# Patient Record
Sex: MALE | Race: WHITE | Employment: FULL TIME | URBAN - METROPOLITAN AREA
[De-identification: names, ages, dates, MRNs, and addresses within clinical notes are randomized per-mention and may not be internally consistent; named-entity substitution may affect disease eponyms.]

---

## 2020-03-23 ENCOUNTER — APPOINTMENT (OUTPATIENT)
Dept: GENERAL RADIOLOGY | Age: 40
End: 2020-03-23
Attending: PHYSICIAN ASSISTANT
Payer: COMMERCIAL

## 2020-03-23 ENCOUNTER — HOSPITAL ENCOUNTER (EMERGENCY)
Age: 40
Discharge: HOME | End: 2020-03-23
Attending: EMERGENCY MEDICINE | Admitting: EMERGENCY MEDICINE
Payer: COMMERCIAL

## 2020-03-23 VITALS
TEMPERATURE: 97.3 F | SYSTOLIC BLOOD PRESSURE: 138 MMHG | DIASTOLIC BLOOD PRESSURE: 86 MMHG | RESPIRATION RATE: 14 BRPM | OXYGEN SATURATION: 97 % | HEART RATE: 65 BPM

## 2020-03-23 DIAGNOSIS — J06.9 VIRAL URI WITH COUGH: Primary | ICD-10-CM

## 2020-03-23 PROCEDURE — 99283 EMERGENCY DEPT VISIT LOW MDM: CPT

## 2020-03-23 NOTE — DISCHARGE INSTRUCTIONS
Preventing the Spread of Coronavirus Disease 2019 in Homes and Residential Communities   For the most recent information go to [email or web address]    Prevention steps for People with confirmed or suspected COVID-19 (including persons under investigation) who do not need to be hospitalized  and   People with confirmed COVID-19 who were hospitalized and determined to be medically stable to go home    Your healthcare provider and public health staff will evaluate whether you can be cared for at home. If it is determined that you do not need to be hospitalized and can be isolated at home, you will be monitored by staff from your local or state health department. You should follow the prevention steps below until a healthcare provider or local or state health department says you can return to your normal activities. Stay home except to get medical care  People who are mildly ill with COVID-19 are able to isolate at home during their illness. You should restrict activities outside your home, except for getting medical care. Do not go to work, school, or public areas. Avoid using public transportation, ride-sharing, or taxis. Separate yourself from other people and animals in your home  People: As much as possible, you should stay in a specific room and away from other people in your home. Also, you should use a separate bathroom, if available. Animals: You should restrict contact with pets and other animals while you are sick with COVID-19, just like you would around other people. Although there have not been reports of pets or other animals becoming sick with COVID-19, it is still recommended that people sick with COVID-19 limit contact with animals until more information is known about the virus. When possible, have another member of your household care for your animals while you are sick.  If you are sick with COVID-19, avoid contact with your pet, including petting, snuggling, being kissed or licked, and sharing food. If you must care for your pet or be around animals while you are sick, wash your hands before and after you interact with pets and wear a facemask. Call ahead before visiting your doctor  If you have a medical appointment, call the healthcare provider and tell them that you have or may have COVID-19. This will help the healthcare providers office take steps to keep other people from getting infected or exposed. Wear a facemask  You should wear a facemask when you are around other people (e.g., sharing a room or vehicle) or pets and before you enter a healthcare providers office. If you are not able to wear a facemask (for example, because it causes trouble breathing), then people who live with you should not stay in the same room with you, or they should wear a facemask if they enter your room. Cover your coughs and sneezes  Cover your mouth and nose with a tissue when you cough or sneeze. Throw used tissues in a lined trash can. Immediately wash your hands with soap and water for at least 20 seconds or, if soap and water are not available, clean your hands with an alcohol-based hand  that contains at least 60% alcohol. Clean your hands often  Wash your hands often with soap and water for at least 20 seconds, especially after blowing your nose, coughing, or sneezing; going to the bathroom; and before eating or preparing food. If soap and water are not readily available, use an alcohol-based hand  with at least 60% alcohol, covering all surfaces of your hands and rubbing them together until they feel dry. Soap and water are the best option if hands are visibly dirty. Avoid touching your eyes, nose, and mouth with unwashed hands. Avoid sharing personal household items  You should not share dishes, drinking glasses, cups, eating utensils, towels, or bedding with other people or pets in your home.  After using these items, they should be washed thoroughly with soap and water. Clean all high-touch surfaces everyday  High touch surfaces include counters, tabletops, doorknobs, bathroom fixtures, toilets, phones, keyboards, tablets, and bedside tables. Also, clean any surfaces that may have blood, stool, or body fluids on them. Use a household cleaning spray or wipe, according to the label instructions. Labels contain instructions for safe and effective use of the cleaning product including precautions you should take when applying the product, such as wearing gloves and making sure you have good ventilation during use of the product. Monitor your symptoms  Seek prompt medical attention if your illness is worsening (e.g., difficulty breathing). Before seeking care, call your healthcare provider and tell them that you have, or are being evaluated for, COVID-19. Put on a facemask before you enter the facility. These steps will help the healthcare providers office to keep other people in the office or waiting room from getting infected or exposed. Ask your healthcare provider to call the local or [facility] health department. Persons who are placed under active monitoring or facilitated self-monitoring should follow instructions provided by their local health department or occupational health professionals, as appropriate. When working with your local health department check their available hours. If you have a medical emergency and need to call 911, notify the dispatch personnel that you have, or are being evaluated for COVID-19. If possible, put on a facemask before emergency medical services arrive. Discontinuing home isolation  Patients with confirmed COVID-19 should remain under home isolation precautions until the risk of secondary transmission to others is thought to be low.  The decision to discontinue home isolation precautions should be made on a case-by-case basis, in consultation with healthcare providers and state and [facility] health departments.

## 2020-03-23 NOTE — LETTER
NOTIFICATION OF RETURN TO WORK 
 
3/23/2020 11:22 AM 
 
Mr. Berta Gustafson [address] 
[address] Reasoner To Whom It May Concern: 
 
Berta Gustafson was under the care of [facility]. He will be able to return to work on Monday, 4/6/20. If there are questions or concerns please have the patient contact our office. Sincerely, Rupinder Christianson PA-C

## 2020-03-23 NOTE — ED TRIAGE NOTES
Pt arrived from home with c/o cough, body aches x 4 days. Vital signs are WNL, pt is afebrile. Pt works at [employer] and has a coworker who's child tested positive for COVID-19.

## 2020-03-23 NOTE — ED NOTES
Pt c/o cough and SOB. 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 I performed a brief evaluation, including history and physical, of the patient here in triage and I have determined that pt will need further treatment and evaluation from the main side ER physician. I have placed initial orders to help in expediting patients care.      March 23, 2020 at 10:58 AM - April KATEY Umanzor PA-C

## 2020-03-23 NOTE — ED PROVIDER NOTES
EMERGENCY DEPARTMENT HISTORY AND PHYSICAL EXAM    11:28 AM      Date: 3/23/2020  Patient Name: Mel Fairbanks    History of Presenting Illness     Chief Complaint   Patient presents with    Cough       History Provided By: Patient    Chief Complaint: cough, body aches, possible exposure to COVID-19, rhinorrhea, congestion  Duration: 4 Days  Timing:  Acute  Location:   Quality: Aching  Severity: Moderate  Modifying Factors: none  Associated Symptoms: denies any other associated signs or symptoms      Additional History (Context):Yeison Denney is a 35 y.o. male who presents to the emergency department for evaluation of cough, body aches, and possible exposure to COVID-19. Patient reports symptoms x4 days. He states 1 of his coworkers has a child who tested positive for Covid-19. No fever, nausea, vomiting, diarrhea, chest pain, or any other complaints. Patient has no other medical problems. He states he previously smoked cigarettes, but no longer smokes cigarettes. No history of asthma. Patient has been taking over-the-counter Mucinex. PCP:  None      Current Outpatient Medications   Medication Sig Dispense Refill    naproxen sodium (ANAPROX DS) 550 mg tablet Take 1 Tab by mouth three (3) times daily (with meals). 20 Tab 0       Past History     Past Medical History:  History reviewed. No pertinent past medical history. Past Surgical History:  History reviewed. No pertinent surgical history. Family History:  History reviewed. No pertinent family history. Social History:  Social History     Tobacco Use    Smoking status: Current Every Day Smoker     Packs/day: 1.00   Substance Use Topics    Alcohol use: Yes     Alcohol/week: 2.5 standard drinks     Types: 3 Glasses of wine per week    Drug use: Not on file       Allergies:  No Known Allergies      Review of Systems     Review of Systems   Constitutional: Positive for fatigue. Negative for chills and fever.    HENT: Positive for congestion and rhinorrhea. Negative for sore throat. Respiratory: Positive for cough. Negative for shortness of breath. Cardiovascular: Negative for chest pain. Gastrointestinal: Negative for abdominal pain, blood in stool, constipation, diarrhea, nausea and vomiting. Genitourinary: Negative for dysuria, frequency and hematuria. Musculoskeletal: Positive for myalgias. Negative for back pain. Skin: Negative for rash and wound. Neurological: Negative for dizziness and headaches. All other systems reviewed and are negative. Physical Exam     Visit Vitals  /86 (BP 1 Location: Left arm)   Pulse 65   Temp 97.3 °F (36.3 °C)   Resp 14   SpO2 97%       Physical Exam  Vitals signs and nursing note reviewed. Constitutional:       General: He is not in acute distress. Appearance: He is well-developed. He is not diaphoretic. HENT:      Head: Normocephalic and atraumatic. Eyes:      Conjunctiva/sclera: Conjunctivae normal.   Neck:      Musculoskeletal: Normal range of motion and neck supple. Cardiovascular:      Rate and Rhythm: Normal rate and regular rhythm. Heart sounds: Normal heart sounds. Pulmonary:      Effort: Pulmonary effort is normal. No respiratory distress. Breath sounds: Normal breath sounds. Chest:      Chest wall: No tenderness. Abdominal:      General: Bowel sounds are normal. There is no distension. Palpations: Abdomen is soft. Tenderness: There is no abdominal tenderness. There is no guarding or rebound. Musculoskeletal: Normal range of motion. General: No deformity. Skin:     General: Skin is warm and dry. Neurological:      Mental Status: He is alert and oriented to person, place, and time. Diagnostic Study Results     Labs -  No results found for this or any previous visit (from the past 12 hour(s)). Radiologic Studies -   No results found. Medical Decision Making   I am the first provider for this patient.     I reviewed the vital signs, available nursing notes, past medical history, past surgical history, family history and social history. Vital Signs-Reviewed the patient's vital signs. Pulse Oximetry Analysis -  97% on room air (Interpretation)    Records Reviewed: Nursing Notes and Old Medical Records (Time of Review: 11:28 AM)    ED Course: Progress Notes, Reevaluation, and Consults:    Provider Notes (Medical Decision Making):   Differential Diagnosis:  influenza, COVID-19, acute bronchitis, URI, streptococcal pharyngitis, pneumonia, asthma exacerbation, allergic rhinitis      Plan: Patient presents ambulatory in no significant distress with normal vitals. Exam and HPI consistent with acute, uncomplicated viral etiology. Possibly Covid-19 given potential exposure and . This patient is well otherwise, no indication for testing at this time. Patient is advised to self isolate for 14 days. Symptomatic treatments at home as well as plenty rest and fluids. At this time, patient is stable and appropriate for discharge home. Patient demonstrates understanding of current diagnoses and is in agreement with the treatment plan. They are advised that while the likelihood of serious underlying condition is low at this point given the evaluation performed today, we cannot fully rule it out. They are advised to immediately return with any new symptoms or worsening of current condition. All questions have been answered. Patient is given educational material regarding their diagnoses, including danger symptoms and when to return to the ED. Diagnosis     Clinical Impression:   1.  Viral URI with cough        Disposition: DC Home    Follow-up Information     Follow up With Specialties Details Why Contact Info    Primary care provider  Call For follow-up     [facility] EMERGENCY DEPT Emergency Medicine Go to As needed, If symptoms worsen [address]  [phone]           Discharge Medication List as of 3/23/2020 12:03 PM      CONTINUE these medications which have NOT CHANGED    Details   naproxen sodium (ANAPROX DS) 550 mg tablet Take 1 Tab by mouth three (3) times daily (with meals). Print, 550 mg, Disp-20 Tab, R-0           _______________________________    This note was dictated utilizing voice recognition software which may lead to typographical errors. I apologize in advance if the situation occurs. If questions arise please do not hesitate to contact me or call our department.   Araceli Gonsalez PA-C

## 2020-03-24 ENCOUNTER — PATIENT OUTREACH (OUTPATIENT)
Dept: CASE MANAGEMENT | Age: 40
End: 2020-03-24

## 2020-03-24 NOTE — PROGRESS NOTES
COVID-19 Screening Initial Follow-up Note    Patient contacted regarding COVID-19 exposure ? symptoms. Care Transition Nurse/ Ambulatory Care Manager contacted the patient by telephone to perform post discharge assessment. Verified name and  with patient as identifiers. Provided introduction to self, and explanation of the CTN/ACM role, and reason for call due to risk factors for infection and/or exposure to COVID-19. Patient states that he is still feeling the same as yesterday. Patient still has cough and some chest discomfort only when he coughs per Pt. . Patient denied Fevers, chills, shortness of breath and difficulty breathing. CDC guidelines reviewed with Pt. Also reviewed red flags on when to go back to ED such as worsening of CP, difficulty breathing and/or high fevers. Strongly advised Patient to call PCP office for any other questions and/or concerns. Patient states okay. Symptoms reviewed with patient who verbalized the following symptoms:   Fever no    Fatigue no   Pain or aching joints no  Cough yes  Shortness of breath no    Confusion or unusual change in mental status no    Chills or shaking no    Sweating no    Fast heart rate no    Fast breathing no    Dizziness/lightheadedness no    Less urine output no    Cold, clammy, and pale skin no  Low body temperature no         Patient has following risk factors of: Possible exposure? CTN/ACM reviewed discharge instructions, medical action plan and red flags such as increased shortness of breath, increasing fever and signs of decompensation with patient who verbalized understanding. Discussed exposure protocols and quarantine with CDC Guidelines What to do if you are sick with coronavirus disease 2019 Patient who was given an opportunity for questions and concerns. The patient agrees to contact the [facility] exposure line, local health department and PCP office for questions related to their healthcare.  CTN provided contact information for future reference.     Reviewed and educated patient on any new and changed medications related to discharge diagnosis     Plan for follow-up call in 5-7 days based on severity of symptoms and risk factors

## 2020-04-03 ENCOUNTER — PATIENT OUTREACH (OUTPATIENT)
Dept: CASE MANAGEMENT | Age: 40
End: 2020-04-03

## 2020-04-03 NOTE — PROGRESS NOTES
CTN  Attempt to contact patient via telephone on 4/3/20 for  follow up. Unable to reach. Left message on voicemail with office contact information. No Patient medical information left on message.

## 2020-04-08 ENCOUNTER — PATIENT OUTREACH (OUTPATIENT)
Dept: CASE MANAGEMENT | Age: 40
End: 2020-04-08

## 2023-05-21 RX ORDER — NAPROXEN SODIUM 550 MG/1
550 TABLET ORAL
COMMUNITY
Start: 2012-11-01